# Patient Record
Sex: MALE | Race: WHITE | NOT HISPANIC OR LATINO
[De-identification: names, ages, dates, MRNs, and addresses within clinical notes are randomized per-mention and may not be internally consistent; named-entity substitution may affect disease eponyms.]

---

## 2023-02-02 ENCOUNTER — TRANSCRIPTION ENCOUNTER (OUTPATIENT)
Age: 48
End: 2023-02-02

## 2023-02-02 ENCOUNTER — INPATIENT (INPATIENT)
Facility: HOSPITAL | Age: 48
LOS: 2 days | Discharge: HOME | DRG: 445 | End: 2023-02-05
Attending: SURGERY | Admitting: SURGERY
Payer: MEDICAID

## 2023-02-02 VITALS
DIASTOLIC BLOOD PRESSURE: 91 MMHG | TEMPERATURE: 98 F | WEIGHT: 242.51 LBS | SYSTOLIC BLOOD PRESSURE: 145 MMHG | RESPIRATION RATE: 18 BRPM | OXYGEN SATURATION: 98 % | HEART RATE: 65 BPM

## 2023-02-02 DIAGNOSIS — R17 UNSPECIFIED JAUNDICE: ICD-10-CM

## 2023-02-02 LAB
ALBUMIN SERPL ELPH-MCNC: 3.5 G/DL — SIGNIFICANT CHANGE UP (ref 3.5–5)
ALP SERPL-CCNC: 141 U/L — HIGH (ref 40–120)
ALT FLD-CCNC: 192 U/L DA — HIGH (ref 10–60)
ANION GAP SERPL CALC-SCNC: 10 MMOL/L — SIGNIFICANT CHANGE UP (ref 5–17)
APPEARANCE UR: CLEAR — SIGNIFICANT CHANGE UP
AST SERPL-CCNC: 59 U/L — HIGH (ref 10–40)
BACTERIA # UR AUTO: ABNORMAL /HPF
BASOPHILS # BLD AUTO: 0.02 K/UL — SIGNIFICANT CHANGE UP (ref 0–0.2)
BASOPHILS NFR BLD AUTO: 0.3 % — SIGNIFICANT CHANGE UP (ref 0–2)
BILIRUB SERPL-MCNC: 7.5 MG/DL — HIGH (ref 0.2–1.2)
BILIRUB UR-MCNC: ABNORMAL
BUN SERPL-MCNC: 14 MG/DL — SIGNIFICANT CHANGE UP (ref 7–18)
CALCIUM SERPL-MCNC: 9.5 MG/DL — SIGNIFICANT CHANGE UP (ref 8.4–10.5)
CHLORIDE SERPL-SCNC: 103 MMOL/L — SIGNIFICANT CHANGE UP (ref 96–108)
CO2 SERPL-SCNC: 28 MMOL/L — SIGNIFICANT CHANGE UP (ref 22–31)
COLOR SPEC: YELLOW — SIGNIFICANT CHANGE UP
CREAT SERPL-MCNC: 1.32 MG/DL — HIGH (ref 0.5–1.3)
DIFF PNL FLD: ABNORMAL
EGFR: 67 ML/MIN/1.73M2 — SIGNIFICANT CHANGE UP
EOSINOPHIL # BLD AUTO: 0.07 K/UL — SIGNIFICANT CHANGE UP (ref 0–0.5)
EOSINOPHIL NFR BLD AUTO: 1.1 % — SIGNIFICANT CHANGE UP (ref 0–6)
EPI CELLS # UR: ABNORMAL /HPF
ETHANOL SERPL-MCNC: <3 MG/DL — SIGNIFICANT CHANGE UP (ref 0–10)
FLUAV AG NPH QL: SIGNIFICANT CHANGE UP
FLUBV AG NPH QL: SIGNIFICANT CHANGE UP
GLUCOSE SERPL-MCNC: 121 MG/DL — HIGH (ref 70–99)
GLUCOSE UR QL: NEGATIVE — SIGNIFICANT CHANGE UP
HCT VFR BLD CALC: 43.2 % — SIGNIFICANT CHANGE UP (ref 39–50)
HGB BLD-MCNC: 14.7 G/DL — SIGNIFICANT CHANGE UP (ref 13–17)
HIV 1 & 2 AB SERPL IA.RAPID: SIGNIFICANT CHANGE UP
IMM GRANULOCYTES NFR BLD AUTO: 0.5 % — SIGNIFICANT CHANGE UP (ref 0–0.9)
KETONES UR-MCNC: NEGATIVE — SIGNIFICANT CHANGE UP
LEUKOCYTE ESTERASE UR-ACNC: ABNORMAL
LIDOCAIN IGE QN: 234 U/L — SIGNIFICANT CHANGE UP (ref 73–393)
LYMPHOCYTES # BLD AUTO: 2.37 K/UL — SIGNIFICANT CHANGE UP (ref 1–3.3)
LYMPHOCYTES # BLD AUTO: 38.1 % — SIGNIFICANT CHANGE UP (ref 13–44)
MAGNESIUM SERPL-MCNC: 2.1 MG/DL — SIGNIFICANT CHANGE UP (ref 1.6–2.6)
MCHC RBC-ENTMCNC: 33 PG — SIGNIFICANT CHANGE UP (ref 27–34)
MCHC RBC-ENTMCNC: 34 GM/DL — SIGNIFICANT CHANGE UP (ref 32–36)
MCV RBC AUTO: 96.9 FL — SIGNIFICANT CHANGE UP (ref 80–100)
MONOCYTES # BLD AUTO: 0.77 K/UL — SIGNIFICANT CHANGE UP (ref 0–0.9)
MONOCYTES NFR BLD AUTO: 12.4 % — SIGNIFICANT CHANGE UP (ref 2–14)
NEUTROPHILS # BLD AUTO: 2.96 K/UL — SIGNIFICANT CHANGE UP (ref 1.8–7.4)
NEUTROPHILS NFR BLD AUTO: 47.6 % — SIGNIFICANT CHANGE UP (ref 43–77)
NITRITE UR-MCNC: NEGATIVE — SIGNIFICANT CHANGE UP
NRBC # BLD: 0 /100 WBCS — SIGNIFICANT CHANGE UP (ref 0–0)
PH UR: 6.5 — SIGNIFICANT CHANGE UP (ref 5–8)
PLATELET # BLD AUTO: 342 K/UL — SIGNIFICANT CHANGE UP (ref 150–400)
POTASSIUM SERPL-MCNC: 3.6 MMOL/L — SIGNIFICANT CHANGE UP (ref 3.5–5.3)
POTASSIUM SERPL-SCNC: 3.6 MMOL/L — SIGNIFICANT CHANGE UP (ref 3.5–5.3)
PROT SERPL-MCNC: 7.4 G/DL — SIGNIFICANT CHANGE UP (ref 6–8.3)
PROT UR-MCNC: 30 MG/DL
RBC # BLD: 4.46 M/UL — SIGNIFICANT CHANGE UP (ref 4.2–5.8)
RBC # FLD: 17.2 % — HIGH (ref 10.3–14.5)
RBC CASTS # UR COMP ASSIST: ABNORMAL /HPF (ref 0–2)
SARS-COV-2 RNA SPEC QL NAA+PROBE: SIGNIFICANT CHANGE UP
SODIUM SERPL-SCNC: 141 MMOL/L — SIGNIFICANT CHANGE UP (ref 135–145)
SP GR SPEC: 1.01 — SIGNIFICANT CHANGE UP (ref 1.01–1.02)
TRIGL SERPL-MCNC: 318 MG/DL — HIGH
TROPONIN I, HIGH SENSITIVITY RESULT: 6.5 NG/L — SIGNIFICANT CHANGE UP
UROBILINOGEN FLD QL: 4
WBC # BLD: 6.22 K/UL — SIGNIFICANT CHANGE UP (ref 3.8–10.5)
WBC # FLD AUTO: 6.22 K/UL — SIGNIFICANT CHANGE UP (ref 3.8–10.5)
WBC UR QL: ABNORMAL /HPF (ref 0–5)

## 2023-02-02 PROCEDURE — 76705 ECHO EXAM OF ABDOMEN: CPT | Mod: 26

## 2023-02-02 PROCEDURE — 93010 ELECTROCARDIOGRAM REPORT: CPT

## 2023-02-02 PROCEDURE — 74177 CT ABD & PELVIS W/CONTRAST: CPT | Mod: 26,MA

## 2023-02-02 PROCEDURE — 71045 X-RAY EXAM CHEST 1 VIEW: CPT | Mod: 26

## 2023-02-02 PROCEDURE — 99285 EMERGENCY DEPT VISIT HI MDM: CPT

## 2023-02-02 RX ORDER — SODIUM CHLORIDE 9 MG/ML
1000 INJECTION INTRAMUSCULAR; INTRAVENOUS; SUBCUTANEOUS
Refills: 0 | Status: DISCONTINUED | OUTPATIENT
Start: 2023-02-02 | End: 2023-02-04

## 2023-02-02 RX ADMIN — SODIUM CHLORIDE 125 MILLILITER(S): 9 INJECTION INTRAMUSCULAR; INTRAVENOUS; SUBCUTANEOUS at 20:17

## 2023-02-02 NOTE — CHART NOTE - NSCHARTNOTEFT_GEN_A_CORE
Alerted by patient's private GI Dr. Wong - sent in for painless jaundice for endoscopic eval. Case discussed with Dr. Wong and plan tentatively for EUS + ERCP tomorrow.     Prelim recs:  F/u CT abdomen/pelvis  CMP, CBC, PT/PTT/INR, type/screen in AM  COVID swab  NPO after midnight Alerted by patient's private GI Dr. Wong - sent in for painless jaundice for endoscopic eval. Case discussed with Dr. Wong and plan tentatively for EUS + ERCP tomorrow.     Prelim recs:  F/u CT abdomen/pelvis  CMP, CBC, PT/PTT/INR, type/screen in AM  COVID swab  NPO after midnight  Formal consult to follow in AM

## 2023-02-02 NOTE — ED PROVIDER NOTE - PROGRESS NOTE DETAILS
Labs/CXR/sono results explained to pt.  Pt with no dilated intrahepatic duct, pt mostly passed the stone.  CT A/P pending.  Since pt with elevated T.bili, case d/w Dr. Dwyer, will admit & repeat LFT

## 2023-02-02 NOTE — ED ADULT NURSE NOTE - IN THE PAST 12 MONTHS HAVE YOU USED DRUGS OTHER THAN THOSE REQUIRED FOR MEDICAL REASON?
Dressing placed to g-tube site per pt preference.  All d/c papers, verbal instructions and follow up given and explained to pt.  All questions addressed and verbalized understanding.  No RX.  Pt ambulatory to exit with steady gait.   
No

## 2023-02-02 NOTE — ED PROVIDER NOTE - OBJECTIVE STATEMENT
[id number] Lorri  [id number] Lorri  47-year-old male complaining of past 2 weeks with dark urine, jaundice, itchiness, weakness, no rash.  Last BM today, renato colored.  Patient admits to having good appetite, early satiety, weight loss 2 to 3 kg in 2 weeks.  Patient had sono done on 1/24 showed biliary ductal dilatation and enlargement of common hepatic duct.  Patient saw GI on Tuesday and was advised to come to ED on Thursday night for ERCP on Friday.

## 2023-02-02 NOTE — ED PROVIDER NOTE - CLINICAL SUMMARY MEDICAL DECISION MAKING FREE TEXT BOX
Pt with painless juandice, concern for biliary obstruction vs mass, will get labs, repeat sono, CT A/P, admission

## 2023-02-03 ENCOUNTER — RESULT REVIEW (OUTPATIENT)
Age: 48
End: 2023-02-03

## 2023-02-03 DIAGNOSIS — K83.8 OTHER SPECIFIED DISEASES OF BILIARY TRACT: ICD-10-CM

## 2023-02-03 DIAGNOSIS — R74.01 ELEVATION OF LEVELS OF LIVER TRANSAMINASE LEVELS: ICD-10-CM

## 2023-02-03 DIAGNOSIS — N39.0 URINARY TRACT INFECTION, SITE NOT SPECIFIED: ICD-10-CM

## 2023-02-03 DIAGNOSIS — Z29.9 ENCOUNTER FOR PROPHYLACTIC MEASURES, UNSPECIFIED: ICD-10-CM

## 2023-02-03 DIAGNOSIS — N20.0 CALCULUS OF KIDNEY: ICD-10-CM

## 2023-02-03 DIAGNOSIS — N17.9 ACUTE KIDNEY FAILURE, UNSPECIFIED: ICD-10-CM

## 2023-02-03 LAB
AFP-TM SERPL-MCNC: 5.1 NG/ML — SIGNIFICANT CHANGE UP
ALBUMIN SERPL ELPH-MCNC: 3.1 G/DL — LOW (ref 3.5–5)
ALP SERPL-CCNC: 129 U/L — HIGH (ref 40–120)
ALT FLD-CCNC: 157 U/L DA — HIGH (ref 10–60)
ANION GAP SERPL CALC-SCNC: 12 MMOL/L — SIGNIFICANT CHANGE UP (ref 5–17)
APTT BLD: 36.8 SEC — HIGH (ref 27.5–35.5)
AST SERPL-CCNC: 53 U/L — HIGH (ref 10–40)
BILIRUB DIRECT SERPL-MCNC: 6.1 MG/DL — HIGH (ref 0–0.3)
BILIRUB DIRECT SERPL-MCNC: 6.2 MG/DL — HIGH (ref 0–0.3)
BILIRUB INDIRECT FLD-MCNC: 1.3 MG/DL — HIGH (ref 0.2–1)
BILIRUB SERPL-MCNC: 7.5 MG/DL — HIGH (ref 0.2–1.2)
BUN SERPL-MCNC: 12 MG/DL — SIGNIFICANT CHANGE UP (ref 7–18)
CALCIUM SERPL-MCNC: 8.7 MG/DL — SIGNIFICANT CHANGE UP (ref 8.4–10.5)
CANCER AG19-9 SERPL-ACNC: 468 U/ML — HIGH
CEA SERPL-MCNC: 1.7 NG/ML — SIGNIFICANT CHANGE UP (ref 0–3.8)
CHLORIDE SERPL-SCNC: 106 MMOL/L — SIGNIFICANT CHANGE UP (ref 96–108)
CO2 SERPL-SCNC: 24 MMOL/L — SIGNIFICANT CHANGE UP (ref 22–31)
CREAT SERPL-MCNC: 0.95 MG/DL — SIGNIFICANT CHANGE UP (ref 0.5–1.3)
EGFR: 99 ML/MIN/1.73M2 — SIGNIFICANT CHANGE UP
GLUCOSE SERPL-MCNC: 111 MG/DL — HIGH (ref 70–99)
HCT VFR BLD CALC: 40.8 % — SIGNIFICANT CHANGE UP (ref 39–50)
HGB BLD-MCNC: 14.1 G/DL — SIGNIFICANT CHANGE UP (ref 13–17)
INR BLD: 1.14 RATIO — SIGNIFICANT CHANGE UP (ref 0.88–1.16)
MAGNESIUM SERPL-MCNC: 2 MG/DL — SIGNIFICANT CHANGE UP (ref 1.6–2.6)
MCHC RBC-ENTMCNC: 32.9 PG — SIGNIFICANT CHANGE UP (ref 27–34)
MCHC RBC-ENTMCNC: 34.6 GM/DL — SIGNIFICANT CHANGE UP (ref 32–36)
MCV RBC AUTO: 95.1 FL — SIGNIFICANT CHANGE UP (ref 80–100)
NRBC # BLD: 0 /100 WBCS — SIGNIFICANT CHANGE UP (ref 0–0)
PHOSPHATE SERPL-MCNC: 2.2 MG/DL — LOW (ref 2.5–4.5)
PLATELET # BLD AUTO: 295 K/UL — SIGNIFICANT CHANGE UP (ref 150–400)
POTASSIUM SERPL-MCNC: 3.6 MMOL/L — SIGNIFICANT CHANGE UP (ref 3.5–5.3)
POTASSIUM SERPL-SCNC: 3.6 MMOL/L — SIGNIFICANT CHANGE UP (ref 3.5–5.3)
PROT SERPL-MCNC: 6.6 G/DL — SIGNIFICANT CHANGE UP (ref 6–8.3)
PROTHROM AB SERPL-ACNC: 13.6 SEC — HIGH (ref 10.5–13.4)
RBC # BLD: 4.29 M/UL — SIGNIFICANT CHANGE UP (ref 4.2–5.8)
RBC # FLD: 17.1 % — HIGH (ref 10.3–14.5)
SODIUM SERPL-SCNC: 142 MMOL/L — SIGNIFICANT CHANGE UP (ref 135–145)
WBC # BLD: 3.93 K/UL — SIGNIFICANT CHANGE UP (ref 3.8–10.5)
WBC # FLD AUTO: 3.93 K/UL — SIGNIFICANT CHANGE UP (ref 3.8–10.5)

## 2023-02-03 PROCEDURE — 99223 1ST HOSP IP/OBS HIGH 75: CPT

## 2023-02-03 PROCEDURE — 43264 ERCP REMOVE DUCT CALCULI: CPT

## 2023-02-03 PROCEDURE — 88305 TISSUE EXAM BY PATHOLOGIST: CPT | Mod: 26

## 2023-02-03 PROCEDURE — 43259 EGD US EXAM DUODENUM/JEJUNUM: CPT

## 2023-02-03 PROCEDURE — 43239 EGD BIOPSY SINGLE/MULTIPLE: CPT | Mod: 59

## 2023-02-03 PROCEDURE — 43261 ENDO CHOLANGIOPANCREATOGRAPH: CPT

## 2023-02-03 PROCEDURE — 99222 1ST HOSP IP/OBS MODERATE 55: CPT | Mod: 25

## 2023-02-03 PROCEDURE — 88312 SPECIAL STAINS GROUP 1: CPT | Mod: 26

## 2023-02-03 PROCEDURE — 43273 ENDOSCOPIC PANCREATOSCOPY: CPT

## 2023-02-03 PROCEDURE — 43274 ERCP DUCT STENT PLACEMENT: CPT

## 2023-02-03 RX ORDER — CEFTRIAXONE 500 MG/1
1000 INJECTION, POWDER, FOR SOLUTION INTRAMUSCULAR; INTRAVENOUS EVERY 24 HOURS
Refills: 0 | Status: DISCONTINUED | OUTPATIENT
Start: 2023-02-03 | End: 2023-02-05

## 2023-02-03 RX ORDER — LANOLIN ALCOHOL/MO/W.PET/CERES
3 CREAM (GRAM) TOPICAL AT BEDTIME
Refills: 0 | Status: DISCONTINUED | OUTPATIENT
Start: 2023-02-03 | End: 2023-02-05

## 2023-02-03 RX ORDER — INDOMETHACIN 50 MG
100 CAPSULE ORAL ONCE
Refills: 0 | Status: COMPLETED | OUTPATIENT
Start: 2023-02-03 | End: 2023-02-03

## 2023-02-03 RX ORDER — ACETAMINOPHEN 500 MG
650 TABLET ORAL EVERY 6 HOURS
Refills: 0 | Status: DISCONTINUED | OUTPATIENT
Start: 2023-02-03 | End: 2023-02-05

## 2023-02-03 RX ORDER — CEFTRIAXONE 500 MG/1
1000 INJECTION, POWDER, FOR SOLUTION INTRAMUSCULAR; INTRAVENOUS ONCE
Refills: 0 | Status: DISCONTINUED | OUTPATIENT
Start: 2023-02-03 | End: 2023-02-03

## 2023-02-03 RX ORDER — ONDANSETRON 8 MG/1
4 TABLET, FILM COATED ORAL EVERY 8 HOURS
Refills: 0 | Status: DISCONTINUED | OUTPATIENT
Start: 2023-02-03 | End: 2023-02-05

## 2023-02-03 RX ORDER — METRONIDAZOLE 500 MG
500 TABLET ORAL EVERY 8 HOURS
Refills: 0 | Status: DISCONTINUED | OUTPATIENT
Start: 2023-02-03 | End: 2023-02-03

## 2023-02-03 RX ORDER — CEFTRIAXONE 500 MG/1
INJECTION, POWDER, FOR SOLUTION INTRAMUSCULAR; INTRAVENOUS
Refills: 0 | Status: DISCONTINUED | OUTPATIENT
Start: 2023-02-03 | End: 2023-02-03

## 2023-02-03 RX ORDER — SODIUM CHLORIDE 9 MG/ML
1000 INJECTION, SOLUTION INTRAVENOUS
Refills: 0 | Status: DISCONTINUED | OUTPATIENT
Start: 2023-02-03 | End: 2023-02-03

## 2023-02-03 RX ORDER — URSODIOL 250 MG/1
1 TABLET, FILM COATED ORAL
Qty: 0 | Refills: 0 | DISCHARGE

## 2023-02-03 RX ORDER — CIPROFLOXACIN LACTATE 400MG/40ML
VIAL (ML) INTRAVENOUS
Refills: 0 | Status: DISCONTINUED | OUTPATIENT
Start: 2023-02-03 | End: 2023-02-03

## 2023-02-03 RX ORDER — CIPROFLOXACIN LACTATE 400MG/40ML
500 VIAL (ML) INTRAVENOUS EVERY 12 HOURS
Refills: 0 | Status: DISCONTINUED | OUTPATIENT
Start: 2023-02-03 | End: 2023-02-03

## 2023-02-03 RX ORDER — METRONIDAZOLE 500 MG
TABLET ORAL
Refills: 0 | Status: DISCONTINUED | OUTPATIENT
Start: 2023-02-03 | End: 2023-02-03

## 2023-02-03 RX ORDER — INDOMETHACIN 50 MG
100 CAPSULE ORAL ONCE
Refills: 0 | Status: DISCONTINUED | OUTPATIENT
Start: 2023-02-03 | End: 2023-02-03

## 2023-02-03 RX ORDER — METRONIDAZOLE 500 MG
500 TABLET ORAL ONCE
Refills: 0 | Status: COMPLETED | OUTPATIENT
Start: 2023-02-03 | End: 2023-02-03

## 2023-02-03 RX ORDER — FENTANYL CITRATE 50 UG/ML
25 INJECTION INTRAVENOUS
Refills: 0 | Status: DISCONTINUED | OUTPATIENT
Start: 2023-02-03 | End: 2023-02-03

## 2023-02-03 RX ADMIN — SODIUM CHLORIDE 125 MILLILITER(S): 9 INJECTION INTRAMUSCULAR; INTRAVENOUS; SUBCUTANEOUS at 19:14

## 2023-02-03 RX ADMIN — Medication 100 MILLIGRAM(S): at 04:41

## 2023-02-03 RX ADMIN — Medication 100 MILLIGRAM(S): at 15:35

## 2023-02-03 RX ADMIN — CEFTRIAXONE 100 MILLIGRAM(S): 500 INJECTION, POWDER, FOR SOLUTION INTRAMUSCULAR; INTRAVENOUS at 10:59

## 2023-02-03 RX ADMIN — Medication 100 MILLIGRAM(S): at 15:00

## 2023-02-03 RX ADMIN — Medication 500 MILLIGRAM(S): at 04:41

## 2023-02-03 NOTE — CONSULT NOTE ADULT - ASSESSMENT
Patient is a 47-year-old male with no PMH p/w c/o 2 weeks with dark orange urine, pale renato colored stool, yellowing of skin, itchiness, increased fatigue and tiredness, increased weakness, no rash. GI consulted for CBD soft tissues and painless jaundice. Patient reports having good appetite however reports early satiety for the past 2 weeks. Patient reports he starts feeling very full and heavy even if he eats small amounts of food. Patient also reports a weight loss 2 to 3 kg in 2 weeks. Patient reports he had an US done 1/24 showed biliary ductal dilatation and enlargement of common hepatic duct.  Patient saw Dr. Wong (GI) on Tuesday and was advised to come to ED on Thursday night for ERCP. Patient also reports feeling nausea, vomiting. CT A/P revealed Soft tissue lesion in the common bile duct with moderate intrahepatic   bile duct dilatation.      Patient denies fever, headache, lightheadedness, chest pain, palpitations, abdominal pain, diarrhea or constipation. Patient also denies any urinary symptoms. No other symptoms were reported at this time.      Patient is a 47-year-old male with no PMH p/w c/o 2 weeks with dark orange urine, pale renato colored stool, yellowing of skin, itchiness, increased fatigue and tiredness, increased weakness, no rash. GI consulted for CBD soft tissues and painless jaundice. Patient reports having good appetite however reports early satiety for the past 2 weeks. Patient reports he starts feeling very full and heavy even if he eats small amounts of food. Patient also reports a weight loss 2 to 3 kg in 2 weeks. Patient reports he had an US done 1/24 showed biliary ductal dilatation and enlargement of common hepatic duct.  Patient saw Dr. Wong (GI) on Tuesday and was advised to come to ED on Thursday night for ERCP. Patient also reports feeling nausea, vomiting. CT A/P revealed Soft tissue lesion in the common bile duct with moderate intrahepatic bile duct dilatation. Lab significant for WBC 3 HH 14/40 Tbili 7.5  AST 59 . Patient seen and examined at bedside. Denies any difficulty swallowing or reflux. NO N&V or abdominal pain. No blood or dark tarry stools. Normal caliber. Never had an EGD or a colonoscopy. No family history of GI issues or cancer.  ETOH: social. SMoke none Patient denies fever, headache, lightheadedness, chest pain, palpitations, abdominal pain, diarrhea or constipation. Patient also denies any urinary symptoms. No other symptoms were reported at this time.     #Painless Jaundice  #CBD mass  #Transaminitis  P/W with painless jaundice. Noted to have elevated LFTs and soft tissue mass in the CBD. Likely. Malignancy is high on the differential. Patient warrants an inpatient GI intervention.   Tentative EUS/ERCP with biopsy +/- stenting today  NPO   CEA, , HEPATIC PANEL, CBC, BMP, PT/INR  Consider Surgical oncology Dr. Lane consult      Patient is a 47-year-old male with no PMH p/w c/o 2 weeks with dark orange urine, pale renato colored stool, yellowing of skin, itchiness, increased fatigue and tiredness, increased weakness, no rash. GI consulted for CBD soft tissues and painless jaundice. Patient reports having good appetite however reports early satiety for the past 2 weeks. Patient reports he starts feeling very full and heavy even if he eats small amounts of food. Patient also reports a weight loss 2 to 3 kg in 2 weeks. Patient reports he had an US done 1/24 showed biliary ductal dilatation and enlargement of common hepatic duct.  Patient saw Dr. Wong (GI) on Tuesday and was advised to come to ED on Thursday night for ERCP. Patient also reports feeling nausea, vomiting. CT A/P revealed Soft tissue lesion in the common bile duct with moderate intrahepatic bile duct dilatation. Lab significant for WBC 3 HH 14/40 Tbili 7.5  AST 59 . Patient seen and examined at bedside. Denies any difficulty swallowing or reflux. NO N&V or abdominal pain. No blood or dark tarry stools. Normal caliber. Never had an EGD or a colonoscopy. No family history of GI issues or cancer.  ETOH: social. SMoke none Patient denies fever, headache, lightheadedness, chest pain, palpitations, abdominal pain, diarrhea or constipation. Patient also denies any urinary symptoms. No other symptoms were reported at this time.     #Painless Jaundice  #CBD mass  #Transaminitis  P/W with painless jaundice. Noted to have elevated LFTs and soft tissue mass in the CBD. Likely. Malignancy is high on the differential. Patient warrants an inpatient GI intervention.   Tentative EUS/ERCP with biopsy +/- stenting today  NPO   CEA, , HEPATIC PANEL, CBC, BMP, PT/INR  Surgical oncology Dr. Lane consult

## 2023-02-03 NOTE — H&P ADULT - NSHPPHYSICALEXAM_GEN_ALL_CORE
PHYSICAL EXAM:  GENERAL: NAD, speaks in full sentences, no signs of respiratory distress  HEAD:  Atraumatic, Normocephalic  EYES: EOMI, PERRLA, yellow discoloration of conjunctiva and sclera  NECK: Supple  CHEST/LUNG: Clear to auscultation bilaterally; No wheeze; No crackles; No accessory muscles used  HEART: Regular rate and rhythm; No murmurs;   ABDOMEN: Soft, Nontender, Nondistended; Bowel sounds present; No guarding  EXTREMITIES:  2+ Peripheral Pulses, No edema  PSYCH: AAOx3  NEUROLOGY: non-focal  SKIN: No rashes or lesions, generalized yellowing of skin

## 2023-02-03 NOTE — CONSULT NOTE ADULT - ASSESSMENT
47M w/obstructive jaundice 2/2 soft tissue mass in CBD  -f/u ERCP today for visualization, biopsy and possible stent  -recommend CEA, CA 19-9, AFP levels  -consider triple phase CT to better evaluate mass   -surgery to follow closely  discussed with attending

## 2023-02-03 NOTE — H&P ADULT - PROBLEM SELECTOR PLAN 5
PPI  Hold AC as patient for ERCP tomorrow. CT scan abdomen/pelvis showed - bilateral nephrolithiasis.  No pain, hematuria.

## 2023-02-03 NOTE — CONSULT NOTE ADULT - SUBJECTIVE AND OBJECTIVE BOX
[facility] GI CONSULTATION    Patient is a 47y old  Male who presents with a chief complaint of Jaundice, dark urine, pale stool (03 Feb 2023 00:27)    HPI:  Patient is a 47-year-old male with no PMH p/w c/o 2 weeks with dark orange urine, pale renato colored stool, yellowing of skin, itchiness, increased fatigue and tiredness, increased weakness, no rash. Patient reports having good appetite however reports early satiety for the past 2 weeks. Patient reports he starts feeling very full and heavy even if he eats small amounts of food. Patient also reports a weight loss 2 to 3 kg in 2 weeks. Patient reports he had an US done 1/24 showed biliary ductal dilatation and enlargement of common hepatic duct.  Patient saw GI on Tuesday and was advised to come to ED on Thursday night for ERCP. Patient also reports feeling nausea, vomiting. Patient denies fever, headache, lightheadedness, chest pain, palpitations, abdominal pain, diarrhea or constipation. Patient also denies any urinary symptoms. No other symptoms were reported at this time.    (03 Feb 2023 00:27)    PMH/PSH:  PAST MEDICAL & SURGICAL HISTORY:  No significant past surgical history        FH:  FAMILY HISTORY:      MEDS:  MEDICATIONS  (STANDING):  cefTRIAXone   IVPB 1000 milliGRAM(s) IV Intermittent every 24 hours  sodium chloride 0.9%. 1000 milliLiter(s) (125 mL/Hr) IV Continuous <Continuous>    MEDICATIONS  (PRN):  acetaminophen     Tablet .. 650 milliGRAM(s) Oral every 6 hours PRN Temp greater or equal to 38C (100.4F), Mild Pain (1 - 3)  aluminum hydroxide/magnesium hydroxide/simethicone Suspension 30 milliLiter(s) Oral every 4 hours PRN Dyspepsia  melatonin 3 milliGRAM(s) Oral at bedtime PRN Insomnia  ondansetron Injectable 4 milliGRAM(s) IV Push every 8 hours PRN Nausea and/or Vomiting    Allergies    No Known Allergies    Intolerances            CONSTITUTIONAL:  No weight loss, fever, chills, weakness or fatigue.  HEENT:  Eyes:  No visual loss, blurred vision, double vision or yellow sclerae. Ears, Nose, Throat:  No hearing loss, sneezing, congestion, runny nose or sore throat.  SKIN:  No rash or itching.  CARDIOVASCULAR:  No chest pain, chest pressure or chest discomfort. No palpitations or edema.  RESPIRATORY:  No shortness of breath, cough or sputum.  GASTROINTESTINAL:  SEE HPI  GENITOURINARY:  No dysuria, hematuria, urinary frequency  NEUROLOGICAL:  No headache, dizziness, syncope, paralysis, ataxia, numbness or tingling in the extremities. No change in bowel or bladder control.  MUSCULOSKELETAL:  No muscle, back pain, joint pain or stiffness.        ______________________________________________________________________  PHYSICAL EXAM:  T(C): 36.7 (02-03-23 @ 06:00), Max: 37.2 (02-03-23 @ 00:21)  HR: 50 (02-03-23 @ 06:00)  BP: 131/77 (02-03-23 @ 06:00)  RR: 16 (02-03-23 @ 06:00)  SpO2: 98% (02-03-23 @ 06:00)  Wt(kg): --      GEN: NAD, normocephalic  HEENT: sclera icteric  CVS: S1S2+  CHEST: clear to auscultation  ABD: soft , nontender, nondistended, bowel sounds present  EXTR: no cyanosis, no clubbing, no edema  NEURO: Awake and alert; oriented x4  SKIN:  warm;  generalized icteric    ______________________________________________________________________  LABS:                        14.7   6.22  )-----------( 342      ( 02 Feb 2023 20:10 )             43.2     02-02    141  |  103  |  14  ----------------------------<  121<H>  3.6   |  28  |  1.32<H>    Ca    9.5      02 Feb 2023 20:10  Mg     2.1     02-02    TPro  7.4  /  Alb  3.5  /  TBili  7.5<H>  /  DBili  x   /  AST  59<H>  /  ALT  192<H>  /  AlkPhos  141<H>  02-02    LIVER FUNCTIONS - ( 02 Feb 2023 20:10 )  Alb: 3.5 g/dL / Pro: 7.4 g/dL / ALK PHOS: 141 U/L / ALT: 192 U/L DA / AST: 59 U/L / GGT: x             ____________________________________________    IMAGING:    ______________________________________________________________________  < from: CT Abdomen and Pelvis w/ IV Cont (02.02.23 @ 23:13) >  ACC: [id number] EXAM:  CT ABDOMEN AND PELVIS IC   ORDERED BY: ERIS HAAS     PROCEDURE DATE:  02/02/2023          INTERPRETATION:  CLINICAL INFORMATION: Painless jaundice    COMPARISON: None.    CONTRAST/COMPLICATIONS:  IV Contrast: Omnipaque [id number] cc administered   10 cc discarded  Oral Contrast: NONE  Complications: None reported at time of study completion    PROCEDURE:  CT of the Abdomen and Pelvis was performed.  Sagittal and coronal reformats were performed.    FINDINGS:  LOWER CHEST: Within normal limits.    LIVER: Within normal limits.  BILE DUCTS: A 2.2 cm soft tissue lesion is present in the common bile   duct with upstream moderate intrahepatic bile duct dilatation.  GALLBLADDER: Contracted.  SPLEEN: Within normal limits.  PANCREAS: Within normal limits.  ADRENALS: Within normal limits.  KIDNEYS/URETERS: Bilateral nephrolithiasis. No hydronephrosis. Symmetric   bilateral renal enhancement.    BLADDER: Within normal limits.  REPRODUCTIVE ORGANS: Prostate within normal limits.    BOWEL: No bowel obstruction. Appendix is normal. Scattered colonic   diverticuli.  PERITONEUM: No ascites.  VESSELS: Within normal limits.  RETROPERITONEUM/LYMPH NODES: No lymphadenopathy.  ABDOMINAL WALL: Within normal limits.  BONES: Degenerative changes.    IMPRESSION:  Soft tissue lesion in the common bile duct with moderate intrahepatic   bile duct dilatation.        --- End of Report ---          < end of copied text >              [facility] GI CONSULTATION    Patient is a 47y old  Male who presents with a chief complaint of Jaundice, dark urine, pale stool (03 Feb 2023 00:27)    HPI:  Patient is a 47-year-old male with no PMH p/w c/o 2 weeks with dark orange urine, pale renato colored stool, yellowing of skin, itchiness, increased fatigue and tiredness, increased weakness, no rash. Patient reports having good appetite however reports early satiety for the past 2 weeks. Patient reports he starts feeling very full and heavy even if he eats small amounts of food. Patient also reports a weight loss 2 to 3 kg in 2 weeks. Patient reports he had an US done 1/24 showed biliary ductal dilatation and enlargement of common hepatic duct.  Patient saw GI on Tuesday and was advised to come to ED on Thursday night for ERCP. Patient also reports feeling nausea, vomiting. Patient denies fever, headache, lightheadedness, chest pain, palpitations, abdominal pain, diarrhea or constipation. Patient also denies any urinary symptoms. No other symptoms were reported at this time.    (03 Feb 2023 00:27)    PMH/PSH:  PAST MEDICAL & SURGICAL HISTORY:  No significant past surgical history        FH:  FAMILY HISTORY:  Denies fhx of pancreaticobiliary disorder    MEDS:  MEDICATIONS  (STANDING):  cefTRIAXone   IVPB 1000 milliGRAM(s) IV Intermittent every 24 hours  sodium chloride 0.9%. 1000 milliLiter(s) (125 mL/Hr) IV Continuous <Continuous>    MEDICATIONS  (PRN):  acetaminophen     Tablet .. 650 milliGRAM(s) Oral every 6 hours PRN Temp greater or equal to 38C (100.4F), Mild Pain (1 - 3)  aluminum hydroxide/magnesium hydroxide/simethicone Suspension 30 milliLiter(s) Oral every 4 hours PRN Dyspepsia  melatonin 3 milliGRAM(s) Oral at bedtime PRN Insomnia  ondansetron Injectable 4 milliGRAM(s) IV Push every 8 hours PRN Nausea and/or Vomiting    Allergies    No Known Allergies    Intolerances            CONSTITUTIONAL:  No weight loss, fever, chills, weakness or fatigue.  HEENT:  Eyes:  No visual loss, blurred vision, double vision or yellow sclerae. Ears, Nose, Throat:  No hearing loss, sneezing, congestion, runny nose or sore throat.  SKIN:  No rash or itching.  CARDIOVASCULAR:  No chest pain, chest pressure or chest discomfort. No palpitations or edema.  RESPIRATORY:  No shortness of breath, cough or sputum.  GASTROINTESTINAL:  SEE HPI  GENITOURINARY:  No dysuria, hematuria, urinary frequency  NEUROLOGICAL:  No headache, dizziness, syncope, paralysis, ataxia, numbness or tingling in the extremities. No change in bowel or bladder control.  MUSCULOSKELETAL:  No muscle, back pain, joint pain or stiffness.        ______________________________________________________________________  PHYSICAL EXAM:  T(C): 36.7 (02-03-23 @ 06:00), Max: 37.2 (02-03-23 @ 00:21)  HR: 50 (02-03-23 @ 06:00)  BP: 131/77 (02-03-23 @ 06:00)  RR: 16 (02-03-23 @ 06:00)  SpO2: 98% (02-03-23 @ 06:00)  Wt(kg): --      GEN: NAD, normocephalic  HEENT: sclera icteric  CVS: S1S2+  CHEST: clear to auscultation  ABD: soft , nontender, nondistended, bowel sounds present  EXTR: no cyanosis, no clubbing, no edema  NEURO: Awake and alert; oriented x4  SKIN:  warm;  generalized icteric    ______________________________________________________________________  LABS:                        14.7   6.22  )-----------( 342      ( 02 Feb 2023 20:10 )             43.2     02-02    141  |  103  |  14  ----------------------------<  121<H>  3.6   |  28  |  1.32<H>    Ca    9.5      02 Feb 2023 20:10  Mg     2.1     02-02    TPro  7.4  /  Alb  3.5  /  TBili  7.5<H>  /  DBili  x   /  AST  59<H>  /  ALT  192<H>  /  AlkPhos  141<H>  02-02    LIVER FUNCTIONS - ( 02 Feb 2023 20:10 )  Alb: 3.5 g/dL / Pro: 7.4 g/dL / ALK PHOS: 141 U/L / ALT: 192 U/L DA / AST: 59 U/L / GGT: x             ____________________________________________    IMAGING:    ______________________________________________________________________  < from: CT Abdomen and Pelvis w/ IV Cont (02.02.23 @ 23:13) >  ACC: [id number] EXAM:  CT ABDOMEN AND PELVIS IC   ORDERED BY: ERIS HAAS     PROCEDURE DATE:  02/02/2023          INTERPRETATION:  CLINICAL INFORMATION: Painless jaundice    COMPARISON: None.    CONTRAST/COMPLICATIONS:  IV Contrast: Omnipaque [id number] cc administered   10 cc discarded  Oral Contrast: NONE  Complications: None reported at time of study completion    PROCEDURE:  CT of the Abdomen and Pelvis was performed.  Sagittal and coronal reformats were performed.    FINDINGS:  LOWER CHEST: Within normal limits.    LIVER: Within normal limits.  BILE DUCTS: A 2.2 cm soft tissue lesion is present in the common bile   duct with upstream moderate intrahepatic bile duct dilatation.  GALLBLADDER: Contracted.  SPLEEN: Within normal limits.  PANCREAS: Within normal limits.  ADRENALS: Within normal limits.  KIDNEYS/URETERS: Bilateral nephrolithiasis. No hydronephrosis. Symmetric   bilateral renal enhancement.    BLADDER: Within normal limits.  REPRODUCTIVE ORGANS: Prostate within normal limits.    BOWEL: No bowel obstruction. Appendix is normal. Scattered colonic   diverticuli.  PERITONEUM: No ascites.  VESSELS: Within normal limits.  RETROPERITONEUM/LYMPH NODES: No lymphadenopathy.  ABDOMINAL WALL: Within normal limits.  BONES: Degenerative changes.    IMPRESSION:  Soft tissue lesion in the common bile duct with moderate intrahepatic   bile duct dilatation.        --- End of Report ---          < end of copied text >

## 2023-02-03 NOTE — H&P ADULT - NSHPSOCIALHISTORY_GEN_ALL_CORE
Patient denies smoking. Patient reports alcohol use only on special occasions. Patient denies use of other drugs.  Patient lives with his wife and is sexually active only with his wife.

## 2023-02-03 NOTE — H&P ADULT - HISTORY OF PRESENT ILLNESS
Patient is a 47-year-old male with no PMH p/w c/o 2 weeks with dark orange urine, pale renato colored stool, yellowing of skin, itchiness, increased fatigue and tiredness, increased weakness, no rash. Patient reports having good appetite however reports early satiety for the past 2 weeks. Patient reports he starts feeling very full and heavy even if he eats small amounts of food. Patient also reports a weight loss 2 to 3 kg in 2 weeks. Patient reports he had an US done 1/24 showed biliary ductal dilatation and enlargement of common hepatic duct.  Patient saw GI on Tuesday and was advised to come to ED on Thursday night for ERCP. Patient also reports feeling nausea, vomiting. Patient denies fever, headache, lightheadedness, chest pain, palpitations, abdominal pain, diarrhea or constipation. Patient also denies any urinary symptoms. No other symptoms were reported at this time.

## 2023-02-03 NOTE — CONSULT NOTE ADULT - NS ATTEND AMEND GEN_ALL_CORE FT
- Painless jaundice.  - Bile duct lesion.  - Elevated LFTs.    Patient seen and examined. CT results discussed. Plan for EUS/ERCP today. - Painless jaundice.  - Bile duct lesion.  - Elevated LFTs.    Patient seen and examined. CT results discussed. Plan for EUS/ERCP today. Risks, benefits, and alternatives of the procedure were discussed at length with  with the patient including but not limited to pancreatitis (5-10% risk), bleeding, perforation, aspiration, infection, anesthesia related complication, etc. Also discussed the administration of an indomethacin suppository during the procedure in order to help prevent pancreatitis which the patient agreed to. Patient acknowledged the risks of the procedure and wished to proceed. - Painless jaundice.  - Bile duct lesion.  - Elevated LFTs.    Patient seen and examined. CT results discussed. Plan for EUS/ERCP today. Risks, benefits, and alternatives of the procedure were discussed at length with  with the patient including but not limited to pancreatitis (5-10% risk), bleeding, perforation, aspiration, infection, anesthesia related complication, etc. Also discussed the administration of an indomethacin suppository during the procedure in order to help prevent pancreatitis which the patient agreed to. Patient acknowledged the risks of the procedure and wished to proceed. Surg/onc consult.

## 2023-02-03 NOTE — H&P ADULT - PROBLEM SELECTOR PLAN 3
UA+ for UTI  Start ceftriaxone x 5 days.   F/U Ucx UA+ for UTI  Patient on Cipro and flagyl empirically.   F/U Ucx UA+ for UTI  Low threshold for treatment for simple UTI  Empiric antibiotics with IV ceftriaxone 1g daily   F/U Ucx

## 2023-02-03 NOTE — CONSULT NOTE ADULT - SUBJECTIVE AND OBJECTIVE BOX
HPI   47-year-old male with no PMH p/w c/o 2 weeks with dark orange urine, pale renato colored stool, yellowing of skin, itchiness, increased fatigue and tiredness, increased weakness. Patient reports having good appetite prior, normal bowel movements prior, however reports early satiety for the past 2 weeks. Patient reports he starts feeling very full and heavy even if he eats small amounts of food. Patient also reports a weight loss 2 to 3 kg in 2 weeks. Patient reports he had an US done 1/24 showed biliary ductal dilatation and enlargement of common hepatic duct.  Patient saw GI on Tuesday and was advised to come to ED on Thursday night for ERCP. CT scan preformed upon admission revealing soft tissue lesion in CBD thus surg/onc consulted.    Patient seen and examined at bedside prior to ERCP, patient confirmed above history and denies any current pain, nasuea, vomiting, fever or malaise    ALL: nkda  PMH: denies  PSH: denies  FH: denies any hx of cancer in parents and siblings  SOCIAL: denies smoking, occasional etoh use, denies work exposure to chemical plants or toxins    EXAM  VSS  well appearing, no acute distress, scleral icterus, generalized jaundice, no rash  RRR  normal respiratory effort  abdomen soft, nt, nd    LABS                 14.1   3.93  )-----------( 295      ( 03 Feb 2023 09:43 )             40.8   02-03    142  |  106  |  12  ----------------------------<  111<H>  3.6   |  24  |  0.95    Ca    8.7      03 Feb 2023 09:43  Phos  2.2     02-03  Mg     2.0     02-03    TPro  6.6  /  Alb  3.1<L>  /  TBili  7.5<H>  /  DBili  6.2<H>  /  AST  53<H>  /  ALT  157<H>  /  AlkPhos  129<H>  02-03    < from: CT Abdomen and Pelvis w/ IV Cont (02.02.23 @ 23:13) >  BILE DUCTS: A 2.2 cm soft tissue lesion is present in the common bile   duct with upstream moderate intrahepatic bile duct dilatation.  GALLBLADDER: Contracted.    < from: US Hepatic & Pancreatic (02.02.23 @ 21:49) >  Intrahepatic bile duct dilatation is noted. The common bile duct is not   dilated.

## 2023-02-03 NOTE — H&P ADULT - PROBLEM SELECTOR PLAN 4
P/w Cr of 1.32 on admission.  Unknown baseline.  Likely prerenal in setting of dehydration/nausea/vomiting.   F/U BMP  Avoid nephrotoxic agents. P/w Cr of 1.32 on admission.  Unknown baseline.  Likely prerenal in setting of dehydration/nausea/vomiting.   C/w IVF   F/U BMP  Avoid nephrotoxic agents. P/w Cr of 1.32 on admission.  Likely prerenal in setting of dehydration/nausea/vomiting.   C/w IVF   F/U BMP  Avoid nephrotoxic agents.

## 2023-02-03 NOTE — H&P ADULT - ATTENDING COMMENTS
47 year old man with PMH of HTN here with 2 weeks of progressive jaundice, pruritus, pale stool and dark urine. No fevers, abdominal pain and no significant weight loss. Outpatient hepatic imaging  CBD dilatation and he was referred to the ED for elective GI work up.     Vital Signs Last 24 Hrs  T(C): 37.2 (03 Feb 2023 00:21), Max: 37.2 (03 Feb 2023 00:21)  T(F): 99 (03 Feb 2023 00:21), Max: 99 (03 Feb 2023 00:21)  HR: 56 (03 Feb 2023 00:21) (56 - 65)  BP: 124/73 (03 Feb 2023 00:21) (124/73 - 145/91)  RR: 16 (03 Feb 2023 00:21) (16 - 18)  SpO2: 99% (03 Feb 2023 00:21) (98% - 99%)    Parameters below as of 03 Feb 2023 00:21  Patient On (Oxygen Delivery Method): room air    Labs                         14.7   6.22  )-----------( 342      ( 02 Feb 2023 20:10 )             43.2     02-02    141  |  103  |  14  ----------------------------<  121<H>  3.6   |  28  |  1.32<H>    Ca    9.5      02 Feb 2023 20:10  Mg     2.1     02-02    TPro  7.4  /  Alb  3.5  /  TBili  7.5<H>  /  DBili  x   /  AST  59<H>  /  ALT  192<H>  /  AlkPhos  141<H>  02-02    UA - noted    CT abdomen  Soft tissue lesion in the common bile duct with moderate intrahepatic   bile duct dilatation.  gallbladder - contracted    Impression   - Suspected CBD mass with painless obstructive jaundice   - Reactive transaminitis  - HTN     Plan   - Admit to Medicine   - GI consult - Dr Vera to see in AM  - For MRCP /ERCP  - Gentle hydration  - Supportive care

## 2023-02-03 NOTE — H&P ADULT - ASSESSMENT
Patient is a 47-year-old male with no PMH p/w c/o 2 weeks with dark orange urine, pale renato colored stool, yellowing of skin, itchiness, increased fatigue and tiredness, increased weakness. Patient admitted for further work up and management of obstructive jaundice.

## 2023-02-03 NOTE — H&P ADULT - PROBLEM SELECTOR PLAN 1
Patient p/w dark urine, pale stool, itchiness.  CT abdomen/pelvis showed -  2.2 cm soft tissue lesion is present in the common bile duct with upstream moderate intrahepatic bile duct dilatation.  ERCP on 2/3/2023  NPO after midnight.  GI consulted - Dr Vera Patient p/w dark urine, pale stool, itchiness.  CT abdomen/pelvis showed -  2.2 cm soft tissue lesion is present in the common bile duct with upstream moderate intrahepatic bile duct dilatation.  ERCP on 2/3/2023  Start Cipro and flagyl empirically.   NPO after midnight.  GI consulted - Dr Vera Patient p/w dark urine, pale stool, itchiness.  CT abdomen/pelvis showed -  2.2 cm soft tissue lesion is present in the common bile duct with upstream moderate intrahepatic bile duct dilatation.  F/U Direct/indirect bili.   ERCP on 2/3/2023  Start Cipro and flagyl empirically.   NPO after midnight.  GI consulted - Dr Vera

## 2023-02-04 LAB
ALBUMIN SERPL ELPH-MCNC: 2.9 G/DL — LOW (ref 3.5–5)
ALP SERPL-CCNC: 112 U/L — SIGNIFICANT CHANGE UP (ref 40–120)
ALT FLD-CCNC: 125 U/L DA — HIGH (ref 10–60)
ANION GAP SERPL CALC-SCNC: 4 MMOL/L — LOW (ref 5–17)
AST SERPL-CCNC: 47 U/L — HIGH (ref 10–40)
BILIRUB DIRECT SERPL-MCNC: 4.3 MG/DL — HIGH (ref 0–0.3)
BILIRUB SERPL-MCNC: 5.4 MG/DL — HIGH (ref 0.2–1.2)
BLD GP AB SCN SERPL QL: SIGNIFICANT CHANGE UP
BUN SERPL-MCNC: 15 MG/DL — SIGNIFICANT CHANGE UP (ref 7–18)
CALCIUM SERPL-MCNC: 8.6 MG/DL — SIGNIFICANT CHANGE UP (ref 8.4–10.5)
CHLORIDE SERPL-SCNC: 106 MMOL/L — SIGNIFICANT CHANGE UP (ref 96–108)
CO2 SERPL-SCNC: 28 MMOL/L — SIGNIFICANT CHANGE UP (ref 22–31)
CREAT SERPL-MCNC: 1.11 MG/DL — SIGNIFICANT CHANGE UP (ref 0.5–1.3)
EGFR: 82 ML/MIN/1.73M2 — SIGNIFICANT CHANGE UP
GLUCOSE SERPL-MCNC: 124 MG/DL — HIGH (ref 70–99)
HCT VFR BLD CALC: 37.8 % — LOW (ref 39–50)
HGB BLD-MCNC: 13 G/DL — SIGNIFICANT CHANGE UP (ref 13–17)
MAGNESIUM SERPL-MCNC: 2.3 MG/DL — SIGNIFICANT CHANGE UP (ref 1.6–2.6)
MCHC RBC-ENTMCNC: 33.1 PG — SIGNIFICANT CHANGE UP (ref 27–34)
MCHC RBC-ENTMCNC: 34.4 GM/DL — SIGNIFICANT CHANGE UP (ref 32–36)
MCV RBC AUTO: 96.2 FL — SIGNIFICANT CHANGE UP (ref 80–100)
NRBC # BLD: 0 /100 WBCS — SIGNIFICANT CHANGE UP (ref 0–0)
PHOSPHATE SERPL-MCNC: 3.1 MG/DL — SIGNIFICANT CHANGE UP (ref 2.5–4.5)
PLATELET # BLD AUTO: 274 K/UL — SIGNIFICANT CHANGE UP (ref 150–400)
POTASSIUM SERPL-MCNC: 3.9 MMOL/L — SIGNIFICANT CHANGE UP (ref 3.5–5.3)
POTASSIUM SERPL-SCNC: 3.9 MMOL/L — SIGNIFICANT CHANGE UP (ref 3.5–5.3)
PROT SERPL-MCNC: 6.1 G/DL — SIGNIFICANT CHANGE UP (ref 6–8.3)
RBC # BLD: 3.93 M/UL — LOW (ref 4.2–5.8)
RBC # FLD: 16.8 % — HIGH (ref 10.3–14.5)
SODIUM SERPL-SCNC: 138 MMOL/L — SIGNIFICANT CHANGE UP (ref 135–145)
WBC # BLD: 5.35 K/UL — SIGNIFICANT CHANGE UP (ref 3.8–10.5)
WBC # FLD AUTO: 5.35 K/UL — SIGNIFICANT CHANGE UP (ref 3.8–10.5)

## 2023-02-04 RX ADMIN — SODIUM CHLORIDE 125 MILLILITER(S): 9 INJECTION INTRAMUSCULAR; INTRAVENOUS; SUBCUTANEOUS at 04:01

## 2023-02-04 RX ADMIN — CEFTRIAXONE 100 MILLIGRAM(S): 500 INJECTION, POWDER, FOR SOLUTION INTRAMUSCULAR; INTRAVENOUS at 10:03

## 2023-02-05 ENCOUNTER — TRANSCRIPTION ENCOUNTER (OUTPATIENT)
Age: 48
End: 2023-02-05

## 2023-02-05 VITALS
TEMPERATURE: 98 F | DIASTOLIC BLOOD PRESSURE: 64 MMHG | RESPIRATION RATE: 16 BRPM | OXYGEN SATURATION: 97 % | SYSTOLIC BLOOD PRESSURE: 108 MMHG | HEART RATE: 74 BPM

## 2023-02-05 LAB
-  AMPICILLIN: SIGNIFICANT CHANGE UP
-  CIPROFLOXACIN: SIGNIFICANT CHANGE UP
-  LEVOFLOXACIN: SIGNIFICANT CHANGE UP
-  NITROFURANTOIN: SIGNIFICANT CHANGE UP
-  TETRACYCLINE: SIGNIFICANT CHANGE UP
-  VANCOMYCIN: SIGNIFICANT CHANGE UP
ALBUMIN SERPL ELPH-MCNC: 2.9 G/DL — LOW (ref 3.5–5)
ALP SERPL-CCNC: 116 U/L — SIGNIFICANT CHANGE UP (ref 40–120)
ALT FLD-CCNC: 108 U/L DA — HIGH (ref 10–60)
ANION GAP SERPL CALC-SCNC: 7 MMOL/L — SIGNIFICANT CHANGE UP (ref 5–17)
AST SERPL-CCNC: 38 U/L — SIGNIFICANT CHANGE UP (ref 10–40)
BILIRUB SERPL-MCNC: 4.9 MG/DL — HIGH (ref 0.2–1.2)
BUN SERPL-MCNC: 14 MG/DL — SIGNIFICANT CHANGE UP (ref 7–18)
CALCIUM SERPL-MCNC: 8.5 MG/DL — SIGNIFICANT CHANGE UP (ref 8.4–10.5)
CHLORIDE SERPL-SCNC: 106 MMOL/L — SIGNIFICANT CHANGE UP (ref 96–108)
CO2 SERPL-SCNC: 27 MMOL/L — SIGNIFICANT CHANGE UP (ref 22–31)
CREAT SERPL-MCNC: 0.86 MG/DL — SIGNIFICANT CHANGE UP (ref 0.5–1.3)
CULTURE RESULTS: SIGNIFICANT CHANGE UP
EGFR: 107 ML/MIN/1.73M2 — SIGNIFICANT CHANGE UP
GLUCOSE SERPL-MCNC: 115 MG/DL — HIGH (ref 70–99)
HCT VFR BLD CALC: 39.1 % — SIGNIFICANT CHANGE UP (ref 39–50)
HGB BLD-MCNC: 13.5 G/DL — SIGNIFICANT CHANGE UP (ref 13–17)
MAGNESIUM SERPL-MCNC: 2.1 MG/DL — SIGNIFICANT CHANGE UP (ref 1.6–2.6)
MCHC RBC-ENTMCNC: 33.1 PG — SIGNIFICANT CHANGE UP (ref 27–34)
MCHC RBC-ENTMCNC: 34.5 GM/DL — SIGNIFICANT CHANGE UP (ref 32–36)
MCV RBC AUTO: 95.8 FL — SIGNIFICANT CHANGE UP (ref 80–100)
METHOD TYPE: SIGNIFICANT CHANGE UP
NRBC # BLD: 0 /100 WBCS — SIGNIFICANT CHANGE UP (ref 0–0)
ORGANISM # SPEC MICROSCOPIC CNT: SIGNIFICANT CHANGE UP
ORGANISM # SPEC MICROSCOPIC CNT: SIGNIFICANT CHANGE UP
PHOSPHATE SERPL-MCNC: 2.3 MG/DL — LOW (ref 2.5–4.5)
PLATELET # BLD AUTO: 262 K/UL — SIGNIFICANT CHANGE UP (ref 150–400)
POTASSIUM SERPL-MCNC: 3.5 MMOL/L — SIGNIFICANT CHANGE UP (ref 3.5–5.3)
POTASSIUM SERPL-SCNC: 3.5 MMOL/L — SIGNIFICANT CHANGE UP (ref 3.5–5.3)
PROT SERPL-MCNC: 6.4 G/DL — SIGNIFICANT CHANGE UP (ref 6–8.3)
RBC # BLD: 4.08 M/UL — LOW (ref 4.2–5.8)
RBC # FLD: 17.2 % — HIGH (ref 10.3–14.5)
SODIUM SERPL-SCNC: 140 MMOL/L — SIGNIFICANT CHANGE UP (ref 135–145)
SPECIMEN SOURCE: SIGNIFICANT CHANGE UP
WBC # BLD: 7.28 K/UL — SIGNIFICANT CHANGE UP (ref 3.8–10.5)
WBC # FLD AUTO: 7.28 K/UL — SIGNIFICANT CHANGE UP (ref 3.8–10.5)

## 2023-02-05 PROCEDURE — 84484 ASSAY OF TROPONIN QUANT: CPT

## 2023-02-05 PROCEDURE — 86850 RBC ANTIBODY SCREEN: CPT

## 2023-02-05 PROCEDURE — 85730 THROMBOPLASTIN TIME PARTIAL: CPT

## 2023-02-05 PROCEDURE — 74177 CT ABD & PELVIS W/CONTRAST: CPT | Mod: MA

## 2023-02-05 PROCEDURE — 85610 PROTHROMBIN TIME: CPT

## 2023-02-05 PROCEDURE — 82248 BILIRUBIN DIRECT: CPT

## 2023-02-05 PROCEDURE — 82105 ALPHA-FETOPROTEIN SERUM: CPT

## 2023-02-05 PROCEDURE — 80053 COMPREHEN METABOLIC PANEL: CPT

## 2023-02-05 PROCEDURE — 84100 ASSAY OF PHOSPHORUS: CPT

## 2023-02-05 PROCEDURE — 99232 SBSQ HOSP IP/OBS MODERATE 35: CPT

## 2023-02-05 PROCEDURE — 86901 BLOOD TYPING SEROLOGIC RH(D): CPT

## 2023-02-05 PROCEDURE — 85025 COMPLETE CBC W/AUTO DIFF WBC: CPT

## 2023-02-05 PROCEDURE — 99285 EMERGENCY DEPT VISIT HI MDM: CPT

## 2023-02-05 PROCEDURE — 80307 DRUG TEST PRSMV CHEM ANLYZR: CPT

## 2023-02-05 PROCEDURE — 83690 ASSAY OF LIPASE: CPT

## 2023-02-05 PROCEDURE — 87086 URINE CULTURE/COLONY COUNT: CPT

## 2023-02-05 PROCEDURE — 88305 TISSUE EXAM BY PATHOLOGIST: CPT

## 2023-02-05 PROCEDURE — 76705 ECHO EXAM OF ABDOMEN: CPT

## 2023-02-05 PROCEDURE — 86301 IMMUNOASSAY TUMOR CA 19-9: CPT

## 2023-02-05 PROCEDURE — 88312 SPECIAL STAINS GROUP 1: CPT

## 2023-02-05 PROCEDURE — 93005 ELECTROCARDIOGRAM TRACING: CPT

## 2023-02-05 PROCEDURE — 81001 URINALYSIS AUTO W/SCOPE: CPT

## 2023-02-05 PROCEDURE — 84478 ASSAY OF TRIGLYCERIDES: CPT

## 2023-02-05 PROCEDURE — 85027 COMPLETE CBC AUTOMATED: CPT

## 2023-02-05 PROCEDURE — 87186 SC STD MICRODIL/AGAR DIL: CPT

## 2023-02-05 PROCEDURE — 82378 CARCINOEMBRYONIC ANTIGEN: CPT

## 2023-02-05 PROCEDURE — 36415 COLL VENOUS BLD VENIPUNCTURE: CPT

## 2023-02-05 PROCEDURE — 87637 SARSCOV2&INF A&B&RSV AMP PRB: CPT

## 2023-02-05 PROCEDURE — 80048 BASIC METABOLIC PNL TOTAL CA: CPT

## 2023-02-05 PROCEDURE — 83735 ASSAY OF MAGNESIUM: CPT

## 2023-02-05 PROCEDURE — 86703 HIV-1/HIV-2 1 RESULT ANTBDY: CPT

## 2023-02-05 PROCEDURE — 86900 BLOOD TYPING SEROLOGIC ABO: CPT

## 2023-02-05 PROCEDURE — 76000 FLUOROSCOPY <1 HR PHYS/QHP: CPT

## 2023-02-05 PROCEDURE — 80076 HEPATIC FUNCTION PANEL: CPT

## 2023-02-05 PROCEDURE — 71045 X-RAY EXAM CHEST 1 VIEW: CPT

## 2023-02-05 PROCEDURE — 82787 IGG 1 2 3 OR 4 EACH: CPT

## 2023-02-05 RX ORDER — LIPASE/PROTEASE/AMYLASE 16-48-48K
1 CAPSULE,DELAYED RELEASE (ENTERIC COATED) ORAL
Qty: 0 | Refills: 0 | DISCHARGE

## 2023-02-05 RX ORDER — AMOXICILLIN 250 MG/5ML
1 SUSPENSION, RECONSTITUTED, ORAL (ML) ORAL
Qty: 20 | Refills: 0
Start: 2023-02-05 | End: 2023-02-14

## 2023-02-05 RX ORDER — SODIUM,POTASSIUM PHOSPHATES 278-250MG
2 POWDER IN PACKET (EA) ORAL
Refills: 0 | Status: COMPLETED | OUTPATIENT
Start: 2023-02-05 | End: 2023-02-05

## 2023-02-05 RX ORDER — ACETAMINOPHEN 500 MG
2 TABLET ORAL
Qty: 0 | Refills: 0 | DISCHARGE
Start: 2023-02-05

## 2023-02-05 RX ORDER — LIPASE/PROTEASE/AMYLASE 16-48-48K
1 CAPSULE,DELAYED RELEASE (ENTERIC COATED) ORAL
Qty: 90 | Refills: 0
Start: 2023-02-05 | End: 2023-03-06

## 2023-02-05 RX ADMIN — CEFTRIAXONE 100 MILLIGRAM(S): 500 INJECTION, POWDER, FOR SOLUTION INTRAMUSCULAR; INTRAVENOUS at 10:01

## 2023-02-05 RX ADMIN — Medication 2 PACKET(S): at 11:35

## 2023-02-05 RX ADMIN — Medication 2 PACKET(S): at 13:18

## 2023-02-05 NOTE — DISCHARGE NOTE PROVIDER - NSDCCPCAREPLAN_GEN_ALL_CORE_FT
PRINCIPAL DISCHARGE DIAGNOSIS  Diagnosis: Painless jaundice  Assessment and Plan of Treatment:       SECONDARY DISCHARGE DIAGNOSES  Diagnosis: PAULINE (acute kidney injury)  Assessment and Plan of Treatment:

## 2023-02-05 NOTE — DISCHARGE NOTE PROVIDER - CARE PROVIDER_API CALL
Tim Armas)  Surgery  [address] [facility], Division [place] [address]  Phone: [phone]  Fax: [phone]  Follow Up Time: 2 weeks

## 2023-02-05 NOTE — DISCHARGE NOTE PROVIDER - NSDCFUADDINST_GEN_ALL_CORE_FT
You came in to the hospital with jaundice, and had a procedure called an ERCP. One of the complications of ERCP is pancreatitis. If you start to experience severe abdominal pain which may radiate to your back, please return to the hospital. One of the blood markers of cancer we sent off was elevated. This, plus the stricture on ERCP, plus your jaundice makes us concerned that you may have a cancer of your biliary tract. It is VERY IMPORTANT that you follow up with surgical oncology once discharged so we can plan surgery once the pathology comes back. In addition, please take the antibiotics which were prescribed for your UTI.

## 2023-02-05 NOTE — DISCHARGE NOTE NURSING/CASE MANAGEMENT/SOCIAL WORK - PATIENT PORTAL LINK FT
You can access the FollowMyHealth Patient Portal offered by [facility] by registering at the following website: [email or web address]. By joining [facility]’s FollowMyHealth portal, you will also be able to view your health information using other applications (apps) compatible with our system.

## 2023-02-05 NOTE — DISCHARGE NOTE PROVIDER - HOSPITAL COURSE
This patient is a 47 M who presented to the ED with 2 weeks of dark orange urine, pale renato colored stool, jaundice, itchiness, and weakness. Patient was admitted to medicine, and underwent ERCP, stent placement, and biopsy of a biliary stricture. Patient was also noted to have elevated Ca 19-9. Post procedurally, patient was transferred to the surgical service, and diet was advanced slowly. He was monitored for development of pancreatitis. Patient remained asymptomatic, tolerated diet, denied abdominal pain, and was discharged home on post procedure day 2 with appropriate follow up for surgical planning and pathology results. He was also discharged home with antibiotics for a UTI present on admission.

## 2023-02-05 NOTE — DISCHARGE NOTE PROVIDER - NSDCMRMEDTOKEN_GEN_ALL_CORE_FT
acetaminophen 325 mg oral tablet: 2 tab(s) orally every 6 hours, As needed, Temp greater or equal to 38C (100.4F), Mild Pain (1 - 3)  Creon 36,000 units oral delayed release capsule: 1 cap(s) orally 3 times a day  Kevin Forte 500 mg oral tablet: 1 tab(s) orally 3 times a day   acetaminophen 325 mg oral tablet: 2 tab(s) orally every 6 hours, As needed, Temp greater or equal to 38C (100.4F), Mild Pain (1 - 3)  amoxicillin 500 mg oral tablet: 1 tab(s) orally 2 times a day   Creon 36,000 units oral delayed release capsule: 1 cap(s) orally 3 times a day  Kevin Forte 500 mg oral tablet: 1 tab(s) orally 3 times a day   acetaminophen 325 mg oral tablet: 2 tab(s) orally every 6 hours, As needed, Temp greater or equal to 38C (100.4F), Mild Pain (1 - 3)  amoxicillin-clavulanate 875 mg-125 mg oral tablet: 1 tab(s) orally 2 times a day   Creon 36,000 units oral delayed release capsule: 1 cap(s) orally 3 times a day  Ekvin Forte 500 mg oral tablet: 1 tab(s) orally 3 times a day

## 2023-02-05 NOTE — PROGRESS NOTE ADULT - SUBJECTIVE AND OBJECTIVE BOX
Interval:  Pt reports feeling well. No abd pain, n/v/d/c, melena, hematohcezia, fever/chills, or other issues. Notes dark urine now lighter, jaudnice improving.    HPI:  Patient is a 47-year-old male with no PMH p/w c/o 2 weeks with dark orange urine, pale renato colored stool, yellowing of skin, itchiness, increased fatigue and tiredness, increased weakness, no rash. Patient reports having good appetite however reports early satiety for the past 2 weeks. Patient reports he starts feeling very full and heavy even if he eats small amounts of food. Patient also reports a weight loss 2 to 3 kg in 2 weeks. Patient reports he had an US done 1/24 showed biliary ductal dilatation and enlargement of common hepatic duct.  Patient saw GI on Tuesday and was advised to come to ED on Thursday night for ERCP. Patient also reports feeling nausea, vomiting. Patient denies fever, headache, lightheadedness, chest pain, palpitations, abdominal pain, diarrhea or constipation. Patient also denies any urinary symptoms. No other symptoms were reported at this time.    (03 Feb 2023 00:27)      PAST MEDICAL & SURGICAL HISTORY:  No significant past surgical history          MEDICATIONS:  acetaminophen     Tablet .. 650 milliGRAM(s) Oral every 6 hours PRN  aluminum hydroxide/magnesium hydroxide/simethicone Suspension 30 milliLiter(s) Oral every 4 hours PRN  cefTRIAXone   IVPB 1000 milliGRAM(s) IV Intermittent every 24 hours  melatonin 3 milliGRAM(s) Oral at bedtime PRN  ondansetron Injectable 4 milliGRAM(s) IV Push every 8 hours PRN      ALLERGIES:  No Known Allergies      SOCIAL HISTORY:   Patient denies smoking. Patient reports alcohol use only on special occasions. Patient denies use of other drugs.  Patient lives with his wife and is sexually active only with his wife. (03 Feb 2023 00:27)      FAMILY HISTORY:      REVIEW OF SYSTEMS:  CONSTITUTIONAL: No weakness, fevers or chills.  EYES/ENT: No visual changes;  No vertigo or throat pain.  NECK: No pain or stiffness.  RESPIRATORY: No cough, wheezing, hemoptysis; No shortness of breath.  CARDIOVASCULAR: No chest pain or palpitations.  GASTROINTESTINAL: As per HPI.   GENITOURINARY: No dysuria, frequency or hematuria.  NEUROLOGICAL: No numbness or weakness.  SKIN: No itching, rashes.      PHYSICAL EXAM:  VITAL SIGNS:  T(C): 36.7 (02-05-23 @ 13:29), Max: 36.7 (02-04-23 @ 22:00)  HR: 74 (02-05-23 @ 13:29) (59 - 74)  BP: 108/64 (02-05-23 @ 13:29) (108/64 - 132/70)  RR: 16 (02-05-23 @ 13:29) (16 - 18)  SpO2: 97% (02-05-23 @ 13:29) (96% - 97%)  I/Os:       GENERAL: JAZZMINE, non toxic, comfortable in bed  HEENT: EOMI, no icterus, no tracheal deviation, moist mucus membranes   CARDIO: Regular rate and rhythm, no murmurs, rubs or gallops  LUNGS: No wheezing, rales or rhonchi  ABDOMEN: Soft, non tender, non distended, no rebound or guarding  VASCULAR: Warm and well perfused without peripheral edema and palpable pulses  PSYCH AAO x 3, normal mood and affect   SKIN: warm, dry, intact     LABS:                         13.5   7.28  )-----------( 262      ( 05 Feb 2023 05:43 )             39.1     02-05    140  |  106  |  14  ----------------------------<  115<H>  3.5   |  27  |  0.86    Ca    8.5      05 Feb 2023 05:43  Phos  2.3     02-05  Mg     2.1     02-05    TPro  6.4  /  Alb  2.9<L>  /  TBili  4.9<H>  /  DBili  x   /  AST  38  /  ALT  108<H>  /  AlkPhos  116  02-05          RADIOLOGY & ADDITIONAL TESTS: Reviewed.     Spoke with primary team and/or other consultants Yes [ ]  No [ ]
Patient seen and examined at the bedside with Dr. Childress, s/p ERCP with biopsy and stent placement yesterday. Patient has been NPO, denies any abdominal pain. He has been ambulating. Bilirubin is trending down today. He has been afebrile, hemodynamically stable.     Vital Signs Last 24 Hrs  T(C): 36.4 (04 Feb 2023 05:05), Max: 37.3 (03 Feb 2023 20:35)  T(F): 97.6 (04 Feb 2023 05:05), Max: 99.2 (03 Feb 2023 20:35)  HR: 58 (04 Feb 2023 05:05) (58 - 88)  BP: 121/64 (04 Feb 2023 05:05) (115/56 - 142/75)  BP(mean): 70 (03 Feb 2023 18:05) (70 - 92)  RR: 18 (04 Feb 2023 05:05) (14 - 20)  SpO2: 99% (04 Feb 2023 05:05) (96% - 100%)    Parameters below as of 04 Feb 2023 05:05  Patient On (Oxygen Delivery Method): room air    Physical exam:   Gen: NAD, AAOx3, resting in bed comfortably   Resp: normal respiratory effort, no accessory muscle use  Abd: soft, nontender, nondistended  MSK: FROM x 4 extremities, warm and well perfused                           13.0   5.35  )-----------( 274      ( 04 Feb 2023 07:15 )             37.8   02-04    138  |  106  |  15  ----------------------------<  124<H>  3.9   |  28  |  1.11    Ca    8.6      04 Feb 2023 07:15  Phos  3.1     02-04  Mg     2.3     02-04    TPro  6.1  /  Alb  2.9<L>  /  TBili  5.4<H>  /  DBili  4.3<H>  /  AST  47<H>  /  ALT  125<H>  /  AlkPhos  112  02-04    MEDICATIONS  (STANDING):  cefTRIAXone   IVPB 1000 milliGRAM(s) IV Intermittent every 24 hours    MEDICATIONS  (PRN):  acetaminophen     Tablet .. 650 milliGRAM(s) Oral every 6 hours PRN Temp greater or equal to 38C (100.4F), Mild Pain (1 - 3)  aluminum hydroxide/magnesium hydroxide/simethicone Suspension 30 milliLiter(s) Oral every 4 hours PRN Dyspepsia  melatonin 3 milliGRAM(s) Oral at bedtime PRN Insomnia  ondansetron Injectable 4 milliGRAM(s) IV Push every 8 hours PRN Nausea and/or Vomiting  
Patient seen and examined at the bedside, PPD2 from ERCP with biopsy and stent placement for obstructive jaundice. Patient is doing well, afebrile, hemodynamically stable. Patient was advanced to regular diet yesterday, and tolerated. He denies abdominal pain, denies nausea/vomiting, is passing gas. He endorses some mild gas/bloated feeling after eating. He has been ambulating. Patient's bilirubin is trending down.     Vital Signs Last 24 Hrs  T(C): 36.7 (05 Feb 2023 05:05), Max: 36.8 (04 Feb 2023 14:16)  T(F): 98.1 (05 Feb 2023 05:05), Max: 98.3 (04 Feb 2023 14:16)  HR: 62 (05 Feb 2023 05:05) (57 - 62)  BP: 132/70 (05 Feb 2023 05:05) (122/71 - 132/70)  BP(mean): --  RR: 18 (05 Feb 2023 05:05) (17 - 18)  SpO2: 96% (05 Feb 2023 05:05) (96% - 97%)    Parameters below as of 05 Feb 2023 05:05  Patient On (Oxygen Delivery Method): room air    Physical exam:   Gen: NAD, AAOx3, resting in bed comfortably   Resp: normal respiratory effort, no accessory muscle use  Abd: soft, nontender, nondistended  MSK: FROM x 4 extremities, warm and well perfused                           13.5   7.28  )-----------( 262      ( 05 Feb 2023 05:43 )             39.1   02-05    140  |  106  |  14  ----------------------------<  115<H>  3.5   |  27  |  0.86    Ca    8.5      05 Feb 2023 05:43  Phos  2.3     02-05  Mg     2.1     02-05    TPro  6.4  /  Alb  2.9<L>  /  TBili  4.9<H>  /  DBili  x   /  AST  38  /  ALT  108<H>  /  AlkPhos  116  02-05

## 2023-02-07 PROBLEM — K83.1 BILIARY STRICTURE: Status: ACTIVE | Noted: 2023-02-07

## 2023-02-07 PROBLEM — Z00.00 ENCOUNTER FOR PREVENTIVE HEALTH EXAMINATION: Status: ACTIVE | Noted: 2023-02-07

## 2023-02-07 LAB
IGG SERPL-MCNC: 997 MG/DL — SIGNIFICANT CHANGE UP (ref 603–1613)
IGG1 SER-MCNC: 457 MG/DL — SIGNIFICANT CHANGE UP (ref 248–810)
IGG2 SER-MCNC: 364 MG/DL — SIGNIFICANT CHANGE UP (ref 130–555)
IGG3 SER-MCNC: 39 MG/DL — SIGNIFICANT CHANGE UP (ref 15–102)
IGG4 SER-MCNC: 30 MG/DL — SIGNIFICANT CHANGE UP (ref 2–96)
SURGICAL PATHOLOGY STUDY: SIGNIFICANT CHANGE UP

## 2023-02-08 ENCOUNTER — APPOINTMENT (OUTPATIENT)
Dept: SURGICAL ONCOLOGY | Facility: CLINIC | Age: 48
End: 2023-02-08
Payer: MEDICAID

## 2023-02-08 VITALS
BODY MASS INDEX: 31.69 KG/M2 | WEIGHT: 234 LBS | DIASTOLIC BLOOD PRESSURE: 86 MMHG | SYSTOLIC BLOOD PRESSURE: 121 MMHG | HEIGHT: 72 IN | HEART RATE: 75 BPM

## 2023-02-08 DIAGNOSIS — C22.1 INTRAHEPATIC BILE DUCT CARCINOMA: ICD-10-CM

## 2023-02-08 DIAGNOSIS — K83.1 OBSTRUCTION OF BILE DUCT: ICD-10-CM

## 2023-02-08 LAB
ALBUMIN SERPL ELPH-MCNC: 4.1 G/DL
ALP BLD-CCNC: 118 U/L
ALT SERPL-CCNC: 54 U/L
AST SERPL-CCNC: 25 U/L
BASOPHILS # BLD AUTO: 0.02 K/UL
BASOPHILS NFR BLD AUTO: 0.2 %
BILIRUB DIRECT SERPL-MCNC: 1.8 MG/DL
BILIRUB INDIRECT SERPL-MCNC: 1.8 MG/DL
BILIRUB SERPL-MCNC: 3.6 MG/DL
CANCER AG19-9 SERPL-ACNC: 187 U/ML
CEA SERPL-MCNC: 1.3 NG/ML
EOSINOPHIL # BLD AUTO: 0.29 K/UL
EOSINOPHIL NFR BLD AUTO: 3.1 %
HCT VFR BLD CALC: 41.8 %
HGB BLD-MCNC: 13.9 G/DL
IMM GRANULOCYTES NFR BLD AUTO: 0.3 %
LYMPHOCYTES # BLD AUTO: 2.13 K/UL
LYMPHOCYTES NFR BLD AUTO: 22.8 %
MAN DIFF?: NORMAL
MCHC RBC-ENTMCNC: 33.3 GM/DL
MCHC RBC-ENTMCNC: 33.9 PG
MCV RBC AUTO: 102 FL
MONOCYTES # BLD AUTO: 1.31 K/UL
MONOCYTES NFR BLD AUTO: 14 %
NEUTROPHILS # BLD AUTO: 5.56 K/UL
NEUTROPHILS NFR BLD AUTO: 59.6 %
NON-GYNECOLOGICAL CYTOLOGY STUDY: SIGNIFICANT CHANGE UP
PLATELET # BLD AUTO: 290 K/UL
PROT SERPL-MCNC: 7.4 G/DL
RBC # BLD: 4.1 M/UL
RBC # FLD: 17.1 %
WBC # FLD AUTO: 9.34 K/UL

## 2023-02-08 PROCEDURE — 99215 OFFICE O/P EST HI 40 MIN: CPT

## 2023-02-08 RX ORDER — URSODIOL 400 MG/1
400 CAPSULE ORAL 3 TIMES DAILY
Qty: 90 | Refills: 0 | Status: ACTIVE | COMMUNITY
Start: 2023-02-08 | End: 1900-01-01

## 2023-02-09 DIAGNOSIS — A04.8 OTHER SPECIFIED BACTERIAL INTESTINAL INFECTIONS: ICD-10-CM

## 2023-02-09 RX ORDER — DOXYCYCLINE HYCLATE 100 MG/1
100 CAPSULE ORAL
Qty: 28 | Refills: 0 | Status: ACTIVE | COMMUNITY
Start: 2023-02-09 | End: 1900-01-01

## 2023-02-09 RX ORDER — METRONIDAZOLE 250 MG/1
250 TABLET ORAL EVERY 6 HOURS
Qty: 56 | Refills: 0 | Status: ACTIVE | COMMUNITY
Start: 2023-02-09 | End: 1900-01-01

## 2023-02-09 RX ORDER — BISMUTH SUBSALICYLATE 262 MG
262 TABLET,CHEWABLE ORAL 4 TIMES DAILY
Qty: 8 | Refills: 1 | Status: ACTIVE | COMMUNITY
Start: 2023-02-09 | End: 1900-01-01

## 2023-02-09 RX ORDER — OMEPRAZOLE 20 MG/1
20 CAPSULE, DELAYED RELEASE ORAL TWICE DAILY
Qty: 28 | Refills: 0 | Status: ACTIVE | COMMUNITY
Start: 2023-02-09 | End: 1900-01-01

## 2023-02-14 NOTE — HISTORY OF PRESENT ILLNESS
[de-identified] : Mr. ALEXEY MCDANIEL is a 47 year old male who present today for follow up visit no PMH. Patient present to [place] ED  with c/o 2 weeks dark orange urine, pale renato colored stool, yellowing of skin, itchiness, increased fatigue and tiredness, increased weakness, no rash. Patient reports having good appetite however reports early satiety for 2 weeks. Reports he felt very full and heavy even if he eats small amounts of food. Patient also reports a weight loss 4 to 5 lbs in 2 weeks. Patient reports he had an US done 1/24 showed biliary ductal dilatation and enlargement of common hepatic duct.  Patient saw GI  who advised to come to ED for ERCP. Patient also reports feeling nausea, vomiting.\par \par CT abd/pelvis 2/2/23: soft tissue lesion in the common bile duct with moderate intrahepatic bile duct dilatation. \par \par EGD/EUS 2/3/23: \par stomach biopsy: Chronic moderately active gastritis \par Esophagus biopsy: fragments of unremarkable squamous esophageal epithelium \par Biliary stricture biopsy: Minute fragment of cauterized glandular mucosal tissue. A few very small fragments of inflamed fibroconnective tissue, possible lamina propria devoid of epithelial lining. \par * Biliary tree: The common hepatic duct left and right hepatic ducts were moderately dilated. There was a severe noted in the mild bile duct with significant wall thickening present. The distal bile duct appeared normal. \par

## 2023-02-14 NOTE — CONSULT LETTER
[Consult Letter:] : I had the pleasure of evaluating your patient, [unfilled]. [Please see my note below.] : Please see my note below. [Consult Closing:] : Thank you very much for allowing me to participate in the care of this patient.  If you have any questions, please do not hesitate to contact me. [Sincerely,] : Sincerely, [Dear  ___] : Dear  [unfilled], [( Thank you for referring [unfilled] for consultation for _____ )] : Thank you for referring [unfilled] for consultation for [unfilled] [FreeTextEntry3] : Tim Lane MD, FICS, FACS\par Director of Surgical Oncology- [facility] \par , Department of Surgery  \par [facility] [employer] [facility] [employer] [facility] \par [address]\[place] [address]\par \par [address]\[place] [address]\par \par [address]\[place] [address]\par \par (mob) [phone]\par (o) [phone]\par (f) [phone]\par

## 2023-02-14 NOTE — PHYSICAL EXAM
[Normal] : supple, no neck mass and thyroid not enlarged [Normal Neck Lymph Nodes] : normal neck lymph nodes  [Normal Supraclavicular Lymph Nodes] : normal supraclavicular lymph nodes [Normal Groin Lymph Nodes] : normal groin lymph nodes [Normal Axillary Lymph Nodes] : normal axillary lymph nodes [Normal] : oriented to person, place and time, with appropriate affect [FreeTextEntry1] : COVID-19 precautions as per [facility] policy was universally followed\par  [de-identified] : Abdomen soft, non-tender, non-distended, and no palpable masses.

## 2023-02-14 NOTE — ASSESSMENT
[FreeTextEntry1] : IMP: 47 year old male with obstructive jaundice - CT abd shows a mid bile duct mass - tight stricture on ERCP. Path - non contributory. However is very suspicious for underlying malignancy\par \par \par EGD/EUS 2/3/23: \par stomach biopsy: Chronic moderately active gastritis \par Esophagus biopsy: fragments of unremarkable squamous esophageal epithelium \par Biliary stricture biopsy: Minute fragment of cauterized glandular mucosal tissue. A few very small fragments of inflamed fibroconnective tissue, possible lamina propria devoid of epithelial lining. \par * Biliary tree: The common hepatic duct left and right hepatic ducts were moderately dilated. There was a severe noted in the mild bile duct with significant wall thickening present. The distal bile duct appeared normal. \par \par \par PLAN: \par -Discussed in lengthen with daughter and patient to consider a repeat biopsy and blood work vs to proceed surgical intervention (Extrahepatic bile duct resection vs Whipple). Patient has decided to proceed with surgery. Patient understand if malignant is recommended to undergo chemotherapy after surgery. \par -Patient is currently take antibiotic for UTI \par - Rpt CA 19-9 187, bili trending down\par \par I have discussed the risks, benefits, alternatives, complications including but not limited bleeding, infection, damage to adjacent structures, sepsis, need for further procedures, sepsis, tumor recurrence to the patient in detail. Patient expressed verbal understanding. Written informed consent to be obtained in the preoperative period \par \par I have discussed the diagnosis, therapeutic plan and options with the patient at length. Patient expressed verbal understanding to proceed with proposed plan. All questions answered. \par  \par \par

## 2023-03-14 ENCOUNTER — NON-APPOINTMENT (OUTPATIENT)
Age: 48
End: 2023-03-14

## 2024-06-19 NOTE — ED PROVIDER NOTE - EYES, MLM
"   Continue all medications at the same doses.  Contact your usual pharmacy if you need refills.       Plan to get the annual influenza vaccine each fall for sure by the end of October for the best protection throughout the winter flu season.   Get this from any pharmacy or flu shot clinic.       Plan to get an updated Covid booster each fall at least by the end of October for the best protection throughout the winter season.   Get this from any pharmacy or Covid vaccine clinic.          Return to see me in 1 year, schedule a follow up visit sooner if needed for anything else.  Use [employer] or Call [phone] to schedule the appointment with me.         5 GOALS TO PREVENT VASCULAR DISEASE:     1.  Aggressive blood pressure control, under 130/80 ideally.  Using medications if needed.    Your blood pressure is under good control    BP Readings from Last 4 Encounters:   06/19/24 110/66   06/13/23 112/74   07/07/22 101/64   05/11/22 104/62       2.  Aggressive LDL cholesterol (\"bad cholesterol\") lowering as indicated.    Your goal is an LDL under 130 for sure, preferably under 100.  (If you have diabetes or previous vascular disease, the the LDL goals would be under 100 for sure, preferably under 70.)    New guidelines identify four high-risk groups who could benefit from statins:   *people with pre-existing heart disease, such as those who have had a heart attack;   *people ages 40 to 75 who have diabetes of any type  *patients ages 40 to 75 with at least a 7.5% risk of developing cardiovascular disease over the next decade, according to a formula described in the guidelines  *patients with the sort of super-high cholesterol that sometimes runs in families, as evidenced by an LDL of 190 milligrams per deciliter or higher    Your cholesterol levels are well controlled.    Recent Labs   Lab Test 05/19/22  0841   CHOL 184   HDL 83   LDL 94   TRIG 35       3.  Aggressive diabetic prevention, screening and/or management.  "     You do not have diabetes as of the most recent blood tests.     4.  No smoking    5.  Consider daily preventative aspirin over age 50 if you have enough cardiac risk factors to place you at higher risk for the presence of vascular disease.    If you have any reason not to take aspirin such easy bruising or bleeding, stomach problems, other anticoagulant medications, or any other side effects, then you should not take Aspirin.     --Based on your current cardiac disease risk profile and/or age over 75, you do NOT need to take daily preventative aspirin.        Preventive Health Recommendations  Male Ages 26 - 45    Yearly exam:             See your health care provider every year in order to:  Review health changes in your medical history or your family medical history  Review and reassess any ongoing chronic medical conditions  Discuss preventive care.    Identify and aggressively manage any vascular disease risk factors (including blood pressure, cholesterol, diabetes)  Review and renew any prescription medications if you take prescription medications.  Consider STD testing if you are at risk.  Cholesterol testing starting at age 25. If you are at risk for heart disease, have your cholesterol tested at least every 5 years.   Diabetes screening every 3 years, if you are at risk for diabetes, then diabetic screening should be done annually.  Colon cancer screening normally begins at age 45, but starting at age 35 if you have a family history of colon cancer below the age of 50.    Shots:   Get the annual influenza vaccine (flu shot) each fall.   Get a tetanus shot every 10 years.   Covid vaccines are now recommended annually.  Get the most updated Covid vaccine when it becomes available, consider getting this at the same time as the annual influenza vaccine.    Nutrition:  Eat at least 5 servings of fruits and vegetables daily.   Eat whole-grain bread, whole-wheat pasta and brown rice instead of white grains and  "rice.   Talk to your provider about Calcium and Vitamin D.        --Good Grains:  Oats, brown rice, Quinoa (these do not raise the blood sugar as much)     --Bad grains:  Anything made from wheat or white rice     (because these raise the blood sugars significantly, and the possible gluten issue from wheat for some people).      --Proteins:  Aim for \"lean proteins\" including chicken, fish, seafood, pork, turkey, and eggs (in moderation); Eat red meat only occasionally    Aram Rebolledo:                      Lifestyle  Exercise for at least 150 minutes a week (30 minutes a day, 5 days a week). This will help you control your weight and prevent disease.   Limit alcohol to one drink per day.   Always use condoms for STD prevention, until you are in a committed monogamous relationship  No smoking.   Wear sunscreen to prevent skin cancer.   See your dentist every six months for an exam and cleaning.       " Clear bilaterally, pupils equal, round and reactive to light.  sclera-not icteric

## (undated) DEVICE — LINE BREATHE SAMPLNG

## (undated) DEVICE — SENSOR O2 FINGER ADULT

## (undated) DEVICE — BITE BLOCK ADULT 20 X 27MM (GREEN)

## (undated) DEVICE — GOWN LG

## (undated) DEVICE — DRSG BANDAID 0.75X3"

## (undated) DEVICE — BITE BLOCK MOUTHPCW/STRAP

## (undated) DEVICE — PACK IV START WITH CHG

## (undated) DEVICE — DRSG 2X2

## (undated) DEVICE — DRSG CURITY GAUZE SPONGE 4 X 4" 12-PLY NON-STERILE

## (undated) DEVICE — CONTAINER FORMALIN 10% 20ML

## (undated) DEVICE — ELCTR ECG CONDUCTIVE ADHESIVE

## (undated) DEVICE — DENTURE CUP PINK

## (undated) DEVICE — SALIVA EJECTOR (BLUE)

## (undated) DEVICE — INJ SYS RAP REFIL

## (undated) DEVICE — BASIN EMESIS 10IN GRADUATED MAUVE

## (undated) DEVICE — TUBING SUCTION NONCONDUCTIVE 6MM X 12FT

## (undated) DEVICE — Device

## (undated) DEVICE — SOL IRR POUR NS 0.9% 500ML

## (undated) DEVICE — CATH IV SAFE BC 22G X 1" (BLUE)

## (undated) DEVICE — SOLIDIFIER ISOLYZER 2000 CC

## (undated) DEVICE — SYR LUER LOK 10CC

## (undated) DEVICE — MASK OXYGEN PANORAMIC

## (undated) DEVICE — SYR LUER LOK 3CC

## (undated) DEVICE — UNDERPAD LINEN SAVER 17 X 24"

## (undated) DEVICE — NDL HYPO SAFE 22G X 1" (BLACK)

## (undated) DEVICE — SOL INJ NS 0.9% 500ML 1-PORT

## (undated) DEVICE — KIT ENDO PROCEDURE CUST W/VLV

## (undated) DEVICE — OMNIPAQUE 300  30ML